# Patient Record
Sex: FEMALE | Race: WHITE | NOT HISPANIC OR LATINO | URBAN - METROPOLITAN AREA
[De-identification: names, ages, dates, MRNs, and addresses within clinical notes are randomized per-mention and may not be internally consistent; named-entity substitution may affect disease eponyms.]

---

## 2018-11-16 ENCOUNTER — OFFICE (OUTPATIENT)
Dept: URBAN - METROPOLITAN AREA CLINIC 78 | Facility: CLINIC | Age: 68
End: 2018-11-16

## 2018-11-16 VITALS
HEART RATE: 86 BPM | WEIGHT: 162 LBS | SYSTOLIC BLOOD PRESSURE: 133 MMHG | DIASTOLIC BLOOD PRESSURE: 87 MMHG | HEIGHT: 66 IN | TEMPERATURE: 97.8 F

## 2018-11-16 DIAGNOSIS — R19.7 DIARRHEA, UNSPECIFIED: ICD-10-CM

## 2018-11-16 PROCEDURE — 99244 OFF/OP CNSLTJ NEW/EST MOD 40: CPT

## 2018-11-16 NOTE — SERVICEHPINOTES
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 She has fecal urgency in daytime (no nocturnal fecal urgency), BMs 6x/day, BSS type 6 to 7 along with LLQ abdominal "cramping" that occurs daily prior to having BMs and resolved with defecation. She has tried Imodium x 2 days that did not help. She still has gallbladder. She was seen by PCP earlier this month that ordered Biofire (included C diff toxin) and CBC that were both normal. No recent abx use within past 3 months. No well water at home. No recent sick contacts. No changes to diet. No changes to medications or supplements. No new NSAID use besides ASA 81 mg qd. Last colonoscopy 05/2015 normal, no polyps, no bx done recall 10 years. She has chronic reflux that is well controlled with Omeprazole 20 mg qd x many years. Denies fevers, n/v, epigastric pain, decreased appetite, blood in stool, melena, weight loss. BR

## 2018-11-20 LAB
C DIFFICILE TOXIN GENE NAA: NEGATIVE
OVA + PARASITE EXAM: (no result)
PANCREATIC ELASTASE, FECAL: >500 UG ELAST./G
RESULT: RESULT 1: (no result)

## 2021-11-16 ENCOUNTER — OFFICE (OUTPATIENT)
Dept: URBAN - METROPOLITAN AREA CLINIC 102 | Facility: CLINIC | Age: 71
End: 2021-11-16
Payer: COMMERCIAL

## 2021-11-16 VITALS
HEART RATE: 85 BPM | HEIGHT: 66 IN | DIASTOLIC BLOOD PRESSURE: 77 MMHG | WEIGHT: 168 LBS | SYSTOLIC BLOOD PRESSURE: 142 MMHG | TEMPERATURE: 97.1 F

## 2021-11-16 DIAGNOSIS — K21.9 GASTRO-ESOPHAGEAL REFLUX DISEASE WITHOUT ESOPHAGITIS: ICD-10-CM

## 2021-11-16 DIAGNOSIS — K22.2 ESOPHAGEAL OBSTRUCTION: ICD-10-CM

## 2021-11-16 DIAGNOSIS — R13.10 DYSPHAGIA, UNSPECIFIED: ICD-10-CM

## 2021-11-16 PROCEDURE — 99214 OFFICE O/P EST MOD 30 MIN: CPT | Performed by: PHYSICIAN ASSISTANT

## 2021-11-16 NOTE — SERVICEHPINOTES
MASSIMO LOMELI   is a   72 yo white female who complains of recurrent dysphagia. She notes feeling of  "trouble passing down"  solids foods (breads/starches), no issues with pills, felt over lower sternal region, and events few times a month. She also has long h/o GERD that is managed on Omeprazole 20 mg qd provided by PCP. She notes if dose of PPI Omeprazole 20 mg is skipped that there is return of heartburn/regurgitation. Last EGD in 11/2018 found Schatzki's ring in the gastroesophageal junction which  was dilated at that time. Denies chest pain, n/v, odynophagia, abdominal pain, change in BMs, melena, weight loss. No other complaints. br
br

## 2022-02-07 ENCOUNTER — AMBULATORY SURGICAL CENTER (OUTPATIENT)
Dept: URBAN - METROPOLITAN AREA SURGERY 23 | Facility: SURGERY | Age: 72
End: 2022-02-07
Payer: COMMERCIAL

## 2022-02-07 DIAGNOSIS — R13.10 DYSPHAGIA, UNSPECIFIED: ICD-10-CM

## 2022-02-07 DIAGNOSIS — K21.9 GASTRO-ESOPHAGEAL REFLUX DISEASE WITHOUT ESOPHAGITIS: ICD-10-CM

## 2022-02-07 DIAGNOSIS — K31.7 POLYP OF STOMACH AND DUODENUM: ICD-10-CM

## 2022-02-07 PROCEDURE — 43235 EGD DIAGNOSTIC BRUSH WASH: CPT | Performed by: INTERNAL MEDICINE

## 2022-02-07 PROCEDURE — 43450 DILATE ESOPHAGUS 1/MULT PASS: CPT | Mod: 59 | Performed by: INTERNAL MEDICINE
